# Patient Record
Sex: MALE | Race: WHITE | NOT HISPANIC OR LATINO
[De-identification: names, ages, dates, MRNs, and addresses within clinical notes are randomized per-mention and may not be internally consistent; named-entity substitution may affect disease eponyms.]

---

## 2024-11-19 ENCOUNTER — DASHBOARD ENCOUNTERS (OUTPATIENT)
Age: 49
End: 2024-11-19

## 2024-11-19 ENCOUNTER — OFFICE VISIT (OUTPATIENT)
Dept: URBAN - METROPOLITAN AREA CLINIC 48 | Facility: CLINIC | Age: 49
End: 2024-11-19
Payer: COMMERCIAL

## 2024-11-19 VITALS
TEMPERATURE: 98.8 F | HEIGHT: 75 IN | SYSTOLIC BLOOD PRESSURE: 135 MMHG | OXYGEN SATURATION: 99 % | WEIGHT: 226.6 LBS | BODY MASS INDEX: 28.17 KG/M2 | HEART RATE: 64 BPM | DIASTOLIC BLOOD PRESSURE: 84 MMHG

## 2024-11-19 DIAGNOSIS — Z12.11 COLON CANCER SCREENING: ICD-10-CM

## 2024-11-19 PROCEDURE — 99202 OFFICE O/P NEW SF 15 MIN: CPT | Performed by: INTERNAL MEDICINE

## 2024-11-19 RX ORDER — OLMESARTAN MEDOXOMIL 20 MG/1
1 TABLET TABLET ORAL ONCE A DAY
Refills: 0 | Status: ACTIVE | COMMUNITY

## 2024-11-19 RX ORDER — AMLODIPINE BESYLATE 5 MG/1
1 TABLET TABLET ORAL ONCE A DAY
Refills: 0 | Status: ACTIVE | COMMUNITY

## 2024-11-19 NOTE — HPI-TODAY'S VISIT:
50 y/o male presents to discuss colon cancer screening and recent rectal bleeding which has since resolved. Last week, he noticed some blood on the towel on the floor of the bathroom in the morning, and thought his wife may have cut her leg shaving. Later that day, he noticed red blood in his underwear that soaked through to his shorts. He felt fine and went and played soccer that evening. The following morning, he again noticed blood in his underwear. He denies any hard stool, straining, or rectal pain. He denies seeing blood in his stool. He is not on anticoagulation. He usually has normal formed stools on a daily basis, with occasional episodes of diarrhea with urgency with no clear trigger. This has been happening since he was a teenager. He has had a few episodes of incontinence in the past. He denies any abdominal pain or change in his typical bowel patterns. Appetite is good and weight is stable. He has never had a colonoscopy and denies known family h/o GI disease or CRC. He was seen at Urgent Care for the rectal bleeding 11/15 and at that time had labs with a normal CBC (Hgb was 14.6; noted to be 15.7 on labs a month prior 10/15/24). CMP in October was normal. A rectal exam was done at urgent care visit and was unremarkable and occult negative.

## 2024-11-21 ENCOUNTER — CLAIMS CREATED FROM THE CLAIM WINDOW (OUTPATIENT)
Dept: URBAN - METROPOLITAN AREA SURGERY CENTER 27 | Facility: SURGERY CENTER | Age: 49
End: 2024-11-21

## 2024-11-21 RX ORDER — AMLODIPINE BESYLATE 5 MG/1
1 TABLET TABLET ORAL ONCE A DAY
Refills: 0 | Status: ACTIVE | COMMUNITY

## 2024-11-21 RX ORDER — OLMESARTAN MEDOXOMIL 20 MG/1
1 TABLET TABLET ORAL ONCE A DAY
Refills: 0 | Status: ACTIVE | COMMUNITY